# Patient Record
Sex: FEMALE | Race: WHITE | NOT HISPANIC OR LATINO | Employment: FULL TIME | URBAN - METROPOLITAN AREA
[De-identification: names, ages, dates, MRNs, and addresses within clinical notes are randomized per-mention and may not be internally consistent; named-entity substitution may affect disease eponyms.]

---

## 2023-08-27 PROBLEM — D72.819 LEUKOPENIA: Status: ACTIVE | Noted: 2022-09-23

## 2023-08-27 PROBLEM — R21 RASH: Status: ACTIVE | Noted: 2020-04-29

## 2023-08-27 PROBLEM — R05.9 COMPLAINING OF COUGH: Status: ACTIVE | Noted: 2021-01-27

## 2023-08-27 PROBLEM — B02.9 ZOSTER: Status: ACTIVE | Noted: 2020-04-29

## 2023-08-27 PROBLEM — R06.00 DYSPNEA: Status: ACTIVE | Noted: 2023-08-27

## 2023-08-27 PROBLEM — E53.8 COBALAMIN DEFICIENCY: Status: ACTIVE | Noted: 2020-07-01

## 2023-08-27 PROBLEM — G47.00 INSOMNIA: Status: ACTIVE | Noted: 2023-08-27

## 2023-08-27 PROBLEM — H93.293 OTHER ABNORMAL AUDITORY PERCEPTIONS, BILATERAL: Status: ACTIVE | Noted: 2023-08-27

## 2023-08-27 PROBLEM — R35.0 URINE FREQUENCY: Status: ACTIVE | Noted: 2019-11-25

## 2023-08-27 PROBLEM — J45.909 ASTHMA (HHS-HCC): Status: ACTIVE | Noted: 2020-07-01

## 2023-08-27 PROBLEM — M25.50 MULTIPLE JOINT PAIN: Status: ACTIVE | Noted: 2023-08-27

## 2023-08-27 PROBLEM — Z20.822 EXPOSURE TO SEVERE ACUTE RESPIRATORY SYNDROME CORONAVIRUS 2 (SARS-COV-2): Status: ACTIVE | Noted: 2020-12-16

## 2023-08-27 PROBLEM — J45.909 ACTIVE ASTHMA (HHS-HCC): Status: ACTIVE | Noted: 2021-02-11

## 2023-08-27 PROBLEM — J45.909 EXTRINSIC ASTHMA WITHOUT STATUS ASTHMATICUS (HHS-HCC): Status: ACTIVE | Noted: 2023-08-27

## 2023-08-27 PROBLEM — H92.02 LEFT EAR PAIN: Status: ACTIVE | Noted: 2020-03-26

## 2023-08-27 PROBLEM — J02.9 SORE THROAT: Status: ACTIVE | Noted: 2020-03-16

## 2023-08-27 PROBLEM — F41.9 ANXIETY: Status: ACTIVE | Noted: 2023-08-27

## 2023-08-27 PROBLEM — R43.2 LOSS OF TASTE: Status: ACTIVE | Noted: 2021-01-27

## 2023-08-27 PROBLEM — E55.9 VITAMIN D DEFICIENCY: Status: ACTIVE | Noted: 2020-07-01

## 2023-08-27 RX ORDER — QUETIAPINE FUMARATE 100 MG/1
100 TABLET, FILM COATED ORAL DAILY
COMMUNITY
Start: 2019-11-25

## 2023-08-27 RX ORDER — QUETIAPINE FUMARATE 100 MG/1
100 TABLET, FILM COATED ORAL DAILY
COMMUNITY
Start: 2020-11-16

## 2023-08-27 RX ORDER — AMITRIPTYLINE HYDROCHLORIDE 150 MG/1
150 TABLET ORAL NIGHTLY
COMMUNITY
Start: 2020-11-16

## 2023-08-27 RX ORDER — CHOLECALCIFEROL (VITAMIN D3) 50 MCG
2000 TABLET ORAL DAILY
COMMUNITY
Start: 2019-11-25

## 2023-08-27 RX ORDER — ALBUTEROL SULFATE 90 UG/1
2 AEROSOL, METERED RESPIRATORY (INHALATION) EVERY 6 HOURS
COMMUNITY
Start: 2021-09-16

## 2023-08-27 RX ORDER — AMITRIPTYLINE HYDROCHLORIDE 75 MG/1
TABLET ORAL
COMMUNITY
Start: 2019-11-25

## 2023-08-27 RX ORDER — AMITRIPTYLINE HYDROCHLORIDE 75 MG/1
TABLET ORAL DAILY
COMMUNITY

## 2023-08-27 RX ORDER — LANOLIN ALCOHOL/MO/W.PET/CERES
2 CREAM (GRAM) TOPICAL DAILY
COMMUNITY
Start: 2022-09-22

## 2023-08-27 RX ORDER — QUETIAPINE FUMARATE 300 MG/1
TABLET, FILM COATED ORAL
COMMUNITY
Start: 2020-07-01

## 2023-08-27 RX ORDER — ZOLPIDEM TARTRATE 5 MG/1
5 TABLET ORAL NIGHTLY PRN
COMMUNITY
Start: 2019-11-25

## 2023-08-27 RX ORDER — MULTIVITAMIN
1 TABLET ORAL DAILY
COMMUNITY
Start: 2019-11-25

## 2023-08-27 RX ORDER — QUETIAPINE FUMARATE 25 MG/1
25 TABLET, FILM COATED ORAL DAILY
COMMUNITY

## 2024-05-23 ENCOUNTER — HOSPITAL ENCOUNTER (OUTPATIENT)
Dept: RADIOLOGY | Facility: CLINIC | Age: 54
Discharge: HOME | End: 2024-05-23
Payer: COMMERCIAL

## 2024-05-23 DIAGNOSIS — S99.911A RIGHT ANKLE INJURY, INITIAL ENCOUNTER: ICD-10-CM

## 2024-05-23 DIAGNOSIS — S99.921A RIGHT FOOT INJURY, INITIAL ENCOUNTER: ICD-10-CM

## 2024-05-23 PROCEDURE — 73610 X-RAY EXAM OF ANKLE: CPT | Mod: RT

## 2024-05-23 PROCEDURE — 73630 X-RAY EXAM OF FOOT: CPT | Mod: RT

## 2024-07-22 ENCOUNTER — APPOINTMENT (OUTPATIENT)
Dept: OTOLARYNGOLOGY | Facility: CLINIC | Age: 54
End: 2024-07-22
Payer: COMMERCIAL

## 2024-07-22 VITALS
BODY MASS INDEX: 25.26 KG/M2 | HEIGHT: 61 IN | TEMPERATURE: 97.2 F | SYSTOLIC BLOOD PRESSURE: 97 MMHG | WEIGHT: 133.8 LBS | DIASTOLIC BLOOD PRESSURE: 68 MMHG

## 2024-07-22 DIAGNOSIS — H69.93 DYSFUNCTION OF BOTH EUSTACHIAN TUBES: Primary | ICD-10-CM

## 2024-07-22 PROCEDURE — 3008F BODY MASS INDEX DOCD: CPT

## 2024-07-22 PROCEDURE — 99203 OFFICE O/P NEW LOW 30 MIN: CPT

## 2024-07-22 NOTE — PROGRESS NOTES
Patient ID: Kaci Biswas is a 54 y.o. female who presents for the evaluation of bilateral ear fullness and ear pressure sensation.     PROVIDER IMPRESSIONS:  DIAGNOSES/PROBLEMS:  -Eustachian tube dysfunction    ASSESSMENT:   Kaci Biswas is a pleasant 54 y.o. female who presents with symptoms of bilateral ear fullness and ear pressure sensation. Based on the clinical information provided, symptoms and clinical exam findings are consistent with possible ETD vs. TMJ dysfunction. Reassurance provided to patient that exam today showed no evidence of acute infection or inflammation in the EAC bilaterally and that TM appears with no evidence of infection, effusion, retraction or perforation bilaterally. There was no visible movement of TM with auto-insufflation under microscopy suggestive of possible ET obstruction. Anterior rhinoscopy exam revealed bilateral ITH with pale/boggy appearance of the nasal mucosa consistent with allergic changes.  I explained to the patient that it is difficult to definitively determine symptom etiology without an audiogram available for review at this visit. We discussed that initial management approach will include empiric/conservative therapy for ETD until audiogram is obtained to provide further management recommendations.     PLAN:  I recommended starting nasal steroid spray  fluticasone (Flonase) for ITH and to maintain ET orifice patency. I recommended either 2 puffs into each nostril daily, or 1 puff into each nostril twice daily for a consistent trial of at least 4-6 weeks. Patient counseled that this medication is a topical intranasal steroid nasal spray indicated to reduce nasal inflammation. Patient was educated on proper administration of nasal spray, by tilting their head down and pointing the applicator tip towards the lateral wall of the nose/corner of the eye on the same side. I advised patient to avoid pointing applicator toward the septum due to the risk of nasal bleeding.   "Patient informed to discontinue the spray if they experience adverse side effects. This medication may be purchased over the counter; a prescription was declined.  Patient instructed to practice autoinsufflation (pinch your nose and pop your ears) several times per day to exercise the Eustachian Tube.  Follow-up: Patient may schedule for follow-up in 4-6 weeks with audiogram and after a consistent medication trial to review the results and evaluate treatment outcomes. They may follow-up sooner, if needed. Patient is agreeable to this plan, all questions were answered to patient's satisfaction.     Subjective   HPI: Kaci Biswas is a 54 y.o. female who presents for the evaluation of bilateral ear fullness/pressure sensation.  The patient states that symptom onset began approximately 1 month ago. She describes symptoms as intermittent sensation that there is fluid moving and draining inside her ears, left greater than right. She reports that she hears a \"squeegy\" sound and clicking in the ears with jaw movement. When asked about the presence of hearing loss, ear pain, ear fullness/pressure, tinnitus, ear itching, ear drainage, autophony, dizziness or vertigo, she admits to occasional pain in both ears. She denies ear pain today. When asked about pertinent otologic history, the patient denies a history of recurrent ear infections, denies history of ear surgery, denies history of PE tube insertion, and denies history of prolonged/traumatic loud noise exposure. The patient denies history of wearing hearing aid devices. The patient does not endorse a family history of hearing loss. Mentions a history of severe seasonal allergies and currently receives biweekly allergy shots for management.       PATIENT HISTORY:  Past Medical History:   Diagnosis Date    Personal history of other diseases of the respiratory system     History of asthma      Past Surgical History:   Procedure Laterality Date    OTHER SURGICAL HISTORY  " 08/26/2021    Ectopic pregnancy removal from fallopian tube      Allergies   Allergen Reactions    Amoxicillin Diarrhea    Peanut Swelling     swelling of her lips and Gi distress        Current Outpatient Medications:     albuterol 90 mcg/actuation inhaler, Inhale 2 puffs every 6 hours. Instructions: use 2 puffs as directed 15 minutes before exercise, Disp: , Rfl:     amitriptyline (Elavil) 150 mg tablet, Take 1 tablet (150 mg) by mouth once daily at bedtime., Disp: , Rfl:     amitriptyline (Elavil) 75 mg tablet, once daily. as directed, Disp: , Rfl:     amitriptyline (Elavil) 75 mg tablet, 0 Refill(s), Type: Maintenance, Disp: , Rfl:     cholecalciferol (Vitamin D-3) 50 MCG (2000 UT) tablet, Take 1 tablet (2,000 Units) by mouth once daily., Disp: , Rfl:     cyanocobalamin (Vitamin B-12) 1,000 mcg tablet, Take 2 tablets (2,000 mcg) by mouth once daily., Disp: , Rfl:     multivitamin tablet, 1 tablet once daily., Disp: , Rfl:     QUEtiapine (Seroquel) 100 mg tablet, Take 1 tablet (100 mg) by mouth once daily., Disp: , Rfl:     QUEtiapine (SEROquel) 100 mg tablet, Take 1 tablet (100 mg) by mouth once daily., Disp: , Rfl:     QUEtiapine (Seroquel) 25 mg tablet, Take 1 tablet (25 mg) by mouth once daily., Disp: , Rfl:     QUEtiapine (SEROquel) 300 mg tablet, 0 Refill(s), Type: Maintenance, Disp: , Rfl:     zolpidem (Ambien) 5 mg tablet, Take 1 tablet (5 mg) by mouth as needed at bedtime., Disp: , Rfl:    Tobacco Use: Medium Risk (7/16/2024)    Received from [facility]    Patient History     Smoking Tobacco Use: Former     Smokeless Tobacco Use: Never     Passive Exposure: Not on file      Alcohol Use: Not on file      Social History     Substance and Sexual Activity   Drug Use Not on file        Review of Systems   All other systems negative.     Objective   Visit Vitals  Smoking Status Never Assessed        PHYSICAL EXAM:  General appearance: Appears well, well-nourished, well groomed. No acute distress.    Constitutional: No fever, chills, weight loss or weight gain.  Communication: Normal communication  Psychiatric: Oriented to person, place and time. Normal mood and affect.  Neurologic: Cranial nerves II-XII grossly intact and symmetric bilaterally.  Cardiovascular: Examination of peripheral vascular system shows no clubbing or cyanosis.  Respiratory: No respiratory distress increased work of breathing. Inspection of the chest with symmetric chest expansion and normal respiratory effort.  Skin: No head and neck rashes.  Head: Normocephalic. Atraumatic with no masses, lesions or scarring.  Face: Normal symmetry. No scars or deformities.  Eyes: Conjunctiva not edematous or erythematous. PERRLA  Neck: Supple and symmetric, trachea midline. Lymph nodes with no adenopathy.  Head: Normocephalic. Atraumatic with no masses, lesions or scarring.  Eyes: PERRL, EOMI, Conjunctiva is clear. No nystagmus.  Nose: External inspection of nose: No nasal lesions, lacerations or scars. No tenderness on frontal or maxillary sinus palpation. Anterior rhinoscopy with limited visualization past the inferior turbinates: Septum is essentially midline.  No septal perforation or lesions. No septal hematoma/ seroma.  No signs of bleeding.  No evidence of intranasal polyps.  Inferior turbinates are hypertrophied with pale/boggy appearance of the nasal mucosa.    Throat:  Floor of mouth is clear, no masses.  Tongue appears normal, no lesions or masses. Gums, gingiva, buccal mucosa appear pink and moist, no lesions. Teeth are in intact.  No obvious dental infections.  Peritonsillar regions appear symmetric without swelling.  Hard and soft palate appear normal, no obvious cleft. Uvula is midline.  Left Tonsil -- 2+, no exudates.  Right Tonsil -- 2+, no exudates.  Oropharynx: No lesions. Retropharyngeal wall is flat.  No postnasal drip.  Salivary Glands: Symmetric bilaterally.  No palpable masses.  No evidence of acute infection or salivary  stones.  TMJ: Normal, no trismus.  Right Ear: External inspection of ear with no deformity, scars, or masses. Mastoid is nontender.   External auditory canal is clear.  TM is intact with no sign of infection, effusion, or retraction.  No perforation seen. Auto insufflation not visible under microscopy.  Left Ear: External inspection of ear with no deformity, scars, or masses. Mastoid is nontender. External auditory canal is clear.  TM is intact with no sign of infection, effusion, or retraction.  No perforation seen. Auto insufflation not visible under microscopy.    RESULTS: No audiogram available for review.       Rosa Cardenas, APRN-CNP

## 2024-07-23 NOTE — PATIENT INSTRUCTIONS
NASAL STEROID SPRAY INSTRUCTIONS: Please use the recommended topical nasal spray as directed. BE SURE TO POINT THE SPRAY TOWARDS THE CORNER OF THE EYE ON THE SAME SIDE NOSTRIL. This will ensure you are treating the appropriate parts of your nose that are swollen or inflamed. This medication will take upwards of one month before you notice full benefit. You MUST use it every day for it to be effective. This medication may be purchase over-the-counter or prescription may be submitted upon request.